# Patient Record
Sex: MALE | Race: OTHER | HISPANIC OR LATINO | URBAN - METROPOLITAN AREA
[De-identification: names, ages, dates, MRNs, and addresses within clinical notes are randomized per-mention and may not be internally consistent; named-entity substitution may affect disease eponyms.]

---

## 2023-02-28 ENCOUNTER — EMERGENCY (EMERGENCY)
Facility: HOSPITAL | Age: 58
LOS: 1 days | Discharge: HOME | End: 2023-02-28
Attending: EMERGENCY MEDICINE | Admitting: EMERGENCY MEDICINE
Payer: COMMERCIAL

## 2023-02-28 VITALS
HEART RATE: 64 BPM | DIASTOLIC BLOOD PRESSURE: 75 MMHG | SYSTOLIC BLOOD PRESSURE: 129 MMHG | RESPIRATION RATE: 18 BRPM | TEMPERATURE: 98 F | OXYGEN SATURATION: 95 %

## 2023-02-28 VITALS
HEART RATE: 67 BPM | TEMPERATURE: 98 F | SYSTOLIC BLOOD PRESSURE: 164 MMHG | HEIGHT: 68 IN | OXYGEN SATURATION: 99 % | RESPIRATION RATE: 20 BRPM | DIASTOLIC BLOOD PRESSURE: 82 MMHG | WEIGHT: 209.44 LBS

## 2023-02-28 LAB
ALBUMIN SERPL ELPH-MCNC: 4 G/DL — SIGNIFICANT CHANGE UP (ref 3.3–5)
ALP SERPL-CCNC: 96 U/L — SIGNIFICANT CHANGE UP (ref 30–120)
ALT FLD-CCNC: 41 U/L DA — SIGNIFICANT CHANGE UP (ref 10–60)
ANION GAP SERPL CALC-SCNC: 7 MMOL/L — SIGNIFICANT CHANGE UP (ref 5–17)
AST SERPL-CCNC: 27 U/L — SIGNIFICANT CHANGE UP (ref 10–40)
BASOPHILS # BLD AUTO: 0.07 K/UL — SIGNIFICANT CHANGE UP (ref 0–0.2)
BASOPHILS NFR BLD AUTO: 0.5 % — SIGNIFICANT CHANGE UP (ref 0–2)
BILIRUB SERPL-MCNC: 0.4 MG/DL — SIGNIFICANT CHANGE UP (ref 0.2–1.2)
BUN SERPL-MCNC: 14 MG/DL — SIGNIFICANT CHANGE UP (ref 7–23)
CALCIUM SERPL-MCNC: 9.1 MG/DL — SIGNIFICANT CHANGE UP (ref 8.4–10.5)
CHLORIDE SERPL-SCNC: 102 MMOL/L — SIGNIFICANT CHANGE UP (ref 96–108)
CO2 SERPL-SCNC: 28 MMOL/L — SIGNIFICANT CHANGE UP (ref 22–31)
CREAT SERPL-MCNC: 0.98 MG/DL — SIGNIFICANT CHANGE UP (ref 0.5–1.3)
EGFR: 91 ML/MIN/1.73M2 — SIGNIFICANT CHANGE UP
EOSINOPHIL # BLD AUTO: 0.43 K/UL — SIGNIFICANT CHANGE UP (ref 0–0.5)
EOSINOPHIL NFR BLD AUTO: 3.3 % — SIGNIFICANT CHANGE UP (ref 0–6)
GLUCOSE SERPL-MCNC: 104 MG/DL — HIGH (ref 70–99)
HCT VFR BLD CALC: 38 % — LOW (ref 39–50)
HGB BLD-MCNC: 13.3 G/DL — SIGNIFICANT CHANGE UP (ref 13–17)
IMM GRANULOCYTES NFR BLD AUTO: 0.5 % — SIGNIFICANT CHANGE UP (ref 0–0.9)
LYMPHOCYTES # BLD AUTO: 1.99 K/UL — SIGNIFICANT CHANGE UP (ref 1–3.3)
LYMPHOCYTES # BLD AUTO: 15.2 % — SIGNIFICANT CHANGE UP (ref 13–44)
MCHC RBC-ENTMCNC: 30.4 PG — SIGNIFICANT CHANGE UP (ref 27–34)
MCHC RBC-ENTMCNC: 35 GM/DL — SIGNIFICANT CHANGE UP (ref 32–36)
MCV RBC AUTO: 86.8 FL — SIGNIFICANT CHANGE UP (ref 80–100)
MONOCYTES # BLD AUTO: 0.92 K/UL — HIGH (ref 0–0.9)
MONOCYTES NFR BLD AUTO: 7 % — SIGNIFICANT CHANGE UP (ref 2–14)
NEUTROPHILS # BLD AUTO: 9.61 K/UL — HIGH (ref 1.8–7.4)
NEUTROPHILS NFR BLD AUTO: 73.5 % — SIGNIFICANT CHANGE UP (ref 43–77)
NRBC # BLD: 0 /100 WBCS — SIGNIFICANT CHANGE UP (ref 0–0)
PLATELET # BLD AUTO: 235 K/UL — SIGNIFICANT CHANGE UP (ref 150–400)
POTASSIUM SERPL-MCNC: 3.8 MMOL/L — SIGNIFICANT CHANGE UP (ref 3.5–5.3)
POTASSIUM SERPL-SCNC: 3.8 MMOL/L — SIGNIFICANT CHANGE UP (ref 3.5–5.3)
PROT SERPL-MCNC: 7.6 G/DL — SIGNIFICANT CHANGE UP (ref 6–8.3)
RBC # BLD: 4.38 M/UL — SIGNIFICANT CHANGE UP (ref 4.2–5.8)
RBC # FLD: 13 % — SIGNIFICANT CHANGE UP (ref 10.3–14.5)
SODIUM SERPL-SCNC: 137 MMOL/L — SIGNIFICANT CHANGE UP (ref 135–145)
TROPONIN I, HIGH SENSITIVITY RESULT: 7 NG/L — SIGNIFICANT CHANGE UP
WBC # BLD: 13.09 K/UL — HIGH (ref 3.8–10.5)
WBC # FLD AUTO: 13.09 K/UL — HIGH (ref 3.8–10.5)

## 2023-02-28 PROCEDURE — 70450 CT HEAD/BRAIN W/O DYE: CPT | Mod: MA

## 2023-02-28 PROCEDURE — 71045 X-RAY EXAM CHEST 1 VIEW: CPT | Mod: 26

## 2023-02-28 PROCEDURE — 96374 THER/PROPH/DIAG INJ IV PUSH: CPT

## 2023-02-28 PROCEDURE — 36415 COLL VENOUS BLD VENIPUNCTURE: CPT

## 2023-02-28 PROCEDURE — 99284 EMERGENCY DEPT VISIT MOD MDM: CPT | Mod: 25

## 2023-02-28 PROCEDURE — 71045 X-RAY EXAM CHEST 1 VIEW: CPT

## 2023-02-28 PROCEDURE — 72125 CT NECK SPINE W/O DYE: CPT | Mod: MA

## 2023-02-28 PROCEDURE — 72125 CT NECK SPINE W/O DYE: CPT | Mod: 26,MA

## 2023-02-28 PROCEDURE — 85025 COMPLETE CBC W/AUTO DIFF WBC: CPT

## 2023-02-28 PROCEDURE — 70450 CT HEAD/BRAIN W/O DYE: CPT | Mod: 26,MA

## 2023-02-28 PROCEDURE — 84484 ASSAY OF TROPONIN QUANT: CPT

## 2023-02-28 PROCEDURE — 80053 COMPREHEN METABOLIC PANEL: CPT

## 2023-02-28 PROCEDURE — 73030 X-RAY EXAM OF SHOULDER: CPT | Mod: 26,RT

## 2023-02-28 PROCEDURE — 73030 X-RAY EXAM OF SHOULDER: CPT

## 2023-02-28 PROCEDURE — 96375 TX/PRO/DX INJ NEW DRUG ADDON: CPT

## 2023-02-28 PROCEDURE — 99285 EMERGENCY DEPT VISIT HI MDM: CPT

## 2023-02-28 RX ORDER — ONDANSETRON 8 MG/1
4 TABLET, FILM COATED ORAL ONCE
Refills: 0 | Status: COMPLETED | OUTPATIENT
Start: 2023-02-28 | End: 2023-02-28

## 2023-02-28 RX ORDER — IBUPROFEN 200 MG
1 TABLET ORAL
Qty: 12 | Refills: 0
Start: 2023-02-28 | End: 2023-03-02

## 2023-02-28 RX ORDER — ACETAMINOPHEN 500 MG
1000 TABLET ORAL ONCE
Refills: 0 | Status: COMPLETED | OUTPATIENT
Start: 2023-02-28 | End: 2023-02-28

## 2023-02-28 RX ORDER — IBUPROFEN 200 MG
600 TABLET ORAL ONCE
Refills: 0 | Status: COMPLETED | OUTPATIENT
Start: 2023-02-28 | End: 2023-02-28

## 2023-02-28 RX ORDER — CYCLOBENZAPRINE HYDROCHLORIDE 10 MG/1
1 TABLET, FILM COATED ORAL
Qty: 9 | Refills: 0
Start: 2023-02-28 | End: 2023-03-02

## 2023-02-28 RX ADMIN — Medication 1000 MILLIGRAM(S): at 21:45

## 2023-02-28 RX ADMIN — Medication 400 MILLIGRAM(S): at 20:02

## 2023-02-28 RX ADMIN — ONDANSETRON 4 MILLIGRAM(S): 8 TABLET, FILM COATED ORAL at 20:02

## 2023-02-28 RX ADMIN — Medication 600 MILLIGRAM(S): at 22:05
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
## 2023-02-28 NOTE — ED PROVIDER NOTE - PATIENT PORTAL LINK FT
You can access the FollowMyHealth Patient Portal offered by [facility] by registering at the following website: [email or web address]. By joining [facility]’s FollowMyHealth portal, you will also be able to view your health information using other applications (apps) compatible with our system.
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
## 2023-02-28 NOTE — ED PROVIDER NOTE - MUSCULOSKELETAL MINIMAL EXAM
mild tenderness to anterior R shoulder, no deformity. No c-spine tenderness/atraumatic/normal range of motion

## 2023-02-28 NOTE — ED PROVIDER NOTE - NSFOLLOWUPCLINICS_GEN_ALL_ED_FT
[place]  Internal Medicine  [address] [place] [id number]  Phone: [phone]  Fax:   Follow Up Time: 4-6 Days

## 2023-02-28 NOTE — ED PROVIDER NOTE - NSFOLLOWUPINSTRUCTIONS_ED_ALL_ED_FT
1) Follow up with your doctor in 1-2 days  2) Return to the ER for worsening or concerning symptoms        [facility]                                                                                                                                                                                                                                                                                                                                                                                                                                                                                                                                                                                                                                                                        Lesión en la brook en los adultos    Head Injury, Adult       Hay muchos tipos de lesiones en la brook. Las lesiones en la brook pueden ser tan leves denise un chichón pequeño o pueden ser un problema médico grave. Algunas de las lesiones graves en la brook son:  •Lesión que provoque un impacto en el cerebro (conmoción cerebral).      •Un moretón (contusión) en el cerebro. [place] significa que hay hemorragia en el cerebro que puede causar hinchazón.      •Fisura en el cráneo (fractura de cráneo).      •Hemorragia en el cerebro que se acumula, se coagula y forma elysia protuberancia (hematoma).      Después de elysia lesión en la brook, la mayoría de los problemas ocurren dentro de las primeras 24 horas, mireille los efectos secundarios pueden aparecer entre 7 y 10 días después de la lesión. Es importante controlar stoner afección para goldy si hay cambios. Es posible que deban observarlo en el departamento de emergencias o en el servicio de atención urgente, o también puede ser que deban hospitalizarlo.      ¿Cuáles son las causas?    Hay muchas causas posibles de elysia lesión en la brook. Las lesiones graves en la brook puede deberse a un accidente automovilístico, a accidentes en bicicleta o motocicleta, a lesiones deportivas, a caídas y a ser golpeado por un objeto.      ¿Cuáles son los síntomas?    Los síntomas de lesión en la brook incluyen elysia contusión, un chichón o sangrado en el lugar de la lesión. Otros síntomas físicos pueden ser:  •Dolor de brook.      •Náuseas o vómitos.      •Mareos.      •Visión borrosa o doble.      •Sentir incomodidad cerca de luces brillantes o ruidos madeline.      •Convulsiones.      •Cansancio.      •Dificultad para despertarse.      •Pérdida de la conciencia.      Los síntomas mentales o emocionales pueden incluir los siguientes:  •Irritabilidad.      •Confusión y problemas de memoria.      •Falta de atención y concentración.      •Cambios en los hábitos de alimentación o en el sueño.      •Sentir ansiedad o depresión.        ¿Cómo se diagnostica?    Esta afección suele diagnosticarse en función de los síntomas, de elysia descripción de la lesión y de un examen físico. También pueden hacerle estudios de diagnóstico por imágenes, denise elysia resonancia magnética (RM) o elysia exploración por tomografía computarizada (TC).      ¿Cómo se trata?    El tratamiento de esta afección depende de la gravedad y el tipo de lesión que sufrió. El objetivo principal del tratamiento es prevenir complicaciones y darle tiempo al cerebro para que se recupere.    Lesión de brook leve     Si usted sufre elysia lesión de brook leve, es posible que lo envíen a casa, y el tratamiento puede incluir lo siguiente:  •Observación. Un adulto responsable debe quedarse con usted chandler 24 horas después de producida la lesión y controlarlo con frecuencia.      •[place] físico.      •[place] cerebral.      •Analgésicos.      Lesión de brook grave    Si tiene elysia lesión de brook grave, el tratamiento puede incluir lo siguiente:•Observación minuciosa. [place] incluye hospitalización con la siguiente atención:  •Exámenes físicos frecuentes.      •Controles frecuentes del funcionamiento del cerebro y el sistema nervioso (estado neurológico).      •Control de la presión arterial y los niveles de oxígeno.        •Medicamentos para aliviar el dolor, prevenir las convulsiones y disminuir la hinchazón del cerebro.      •Protección de las vías respiratorias y asistencia respiratoria. [place] puede incluir un respirador.      •Tratamientos para controlar y tratar la hinchazón dentro del cerebro.    •Cirugía de cerebro. Esta puede ser necesaria en los siguientes casos:  •Extraer elysia acumulación de jere o coágulos de jere.      •Interrumpir el sangrado.      •Retirar elysia parte del cráneo para dejar espacio a fin de que el cerebro se hinche.          Siga estas instrucciones en stoner casa:    Actividad     •Descanse y evite actividades que cristobal físicamente difíciles o agotadoras.      •Asegúrese de dormir lo suficiente.    •Deje que el cerebro descanse limitando las actividades que requieran pensar mucho o prestar atención, denise las siguientes:  •Mirar televisión.      •Jugar juegos de memoria y armar rompecabezas.      •Tareas para el hogar o trabajos relacionados con el empleo.      •Trabajar en la computadora, usar las redes sociales y enviar mensajes de texto.        •Evite actividades que puedan causar otra lesión en la brook, denise practicar deportes, hasta que el médico lo autorice. Puede ser peligroso tener otra lesión en la brook antes de que se haya recuperado de la primera.      •Pregúntele al médico cuándo puede retomar nolvia actividades habituales, incluidos el trabajo o el estudio. Pídale al médico un plan escalonado para retomar nolvia actividades de forma gradual.      •Consulte a stoner médico sobre cuándo puede conducir, andar en bicicleta o usar maquinaria pesada. La capacidad para reaccionar puede ser más lenta luego de elysia lesión cerebral. No realice estas actividades si se siente mareado.        Estilo de eloy      • No vickie alcohol hasta que el médico lo autorice. No consuma drogas. El alcohol y ciertas drogas pueden demorar stoner recuperación y ponerlo en riesgo de nuevas lesiones.      •Si le resulta más difícil que lo habitual recordar las cosas, escríbalas.      •Trate de hacer elysia cosa por vez si se distrae con facilidad.      •Consulte con familiares y amigos si debe kathryn decisiones importantes.      •Cuénteles sobre stoner lesión, los síntomas y las restricciones a nolvia amigos, a stoner graeme, a un colega de confianza y a stoner gerente en el trabajo. Pídales que observen si aparecen nuevos problemas o empeoran los existentes.      Instrucciones generales     •Carrier los medicamentos de venta antoine y los recetados solamente denise se lo haya indicado el médico.      •Pídale a alguien que lo acompañe chandler 24 horas después de la lesión en la brook. Esta persona debe observar cambios en los síntomas y estar preparada para obtener ayuda médica.      •Concurra a todas las visitas de seguimiento denise se lo haya indicado el médico. [place] es importante.        ¿Cómo se stephania?    •Trabaje para mejorar el equilibrio y la fuerza a fin de evitar caídas.      •Use el cinturón de seguridad cuando se encuentre en un vehículo en movimiento.      •Use un enedelia al andar en bicicleta, esquiar o practicar cualquier otro deporte o actividad que tenga riesgo de lesiones.    •Si puneet alcohol:•Limite la cantidad que puneet:  •De 0 a 1 medida por día para las mujeres que no estén embarazadas.      •De 0 a 2 medidas por día para los hombres.        •Esté atento a la cantidad de alcohol que hay en las bebidas que carol. En los [place], elysia medida equivale a elysia botella de cerveza de 12 oz (355 ml), un vaso de vino de 5 oz (148 ml) o un vaso de elysia bebida alcohólica de lucia graduación de 1½ oz (44 ml).      •Carrier medidas de seguridad en stoner hogar, por ejemplo:  •Mantenga los pisos y las escaleras en orden.      •Coloque barras para sostén en los germaine y pasamanos en las escaleras.      •Ponga alfombras antideslizantes en pisos y bañeras.      •Mejore la iluminación en las zonas de penumbra.          Dónde buscar más información    •Centers for Disease Control and Prevention (Centros para el Control y la Prevención de Enfermedades): www.cdc.gov        Solicite ayuda de inmediato si:  •Tiene lo siguiente:  •Dolor de brook intenso que no desaparece con los medicamentos.      •Dificultad para caminar o debilidad en los brazos o las piernas.      •Secreción transparente o con jere que proviene de la nariz o de los oídos.      •Cambios en la visión.      •Elysia convulsión.      •Mayor confusión o irritabilidad.        •Nolvia síntomas empeoran.      •Está más somnoliento de lo normal o tiene dificultad para mantenerse despierto.      •Pierde el equilibrio.      •Las pupilas cambian de tamaño.      •Arrastra las palabras.      •Stoner mareo empeora.      •Vomita.      Estos síntomas pueden representar un problema grave que constituye elysia emergencia. No espere a goldy si los síntomas desaparecen. Solicite atención médica de inmediato. Comuníquese con el servicio de emergencias de stoner localidad (911 en los [place]). No conduzca por nolvia propios medios hasta el hospital.       Resumen    •Las lesiones en la brook pueden ser leves o pueden ser un problema médico grave que requiere atención inmediata.      •El tratamiento de esta afección depende de la gravedad y el tipo de lesión que sufrió.      •Pídale a alguien que lo acompañe chandler 24 horas después de la lesión y que orn cómo está usted con frecuencia.      •Pregúntele al médico cuándo puede retomar nolvia actividades habituales, incluidos el trabajo o el estudio.      •La prevención de lesiones en la brook incluye el uso de cinturón de seguridad en un vehículo motorizado, el uso de enedelia en bicicleta, limitar el consumo de alcohol y kathryn medidas de seguridad en el hogar.      Esta información no tiene denise fin reemplazar el consejo del médico. Asegúrese de hacerle al médico cualquier pregunta que tenga.      Document Revised: 01/22/2021 Document Reviewed: 01/22/2021    Elsevier Patient Education © 2023 [employer]
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
## 2023-02-28 NOTE — ED ADULT NURSE NOTE - NSIMPLEMENTINTERV_GEN_ALL_ED
Implemented All Fall Risk Interventions:  [place] to call system. Call bell, personal items and telephone within reach. Instruct patient to call for assistance. Room bathroom lighting operational. Non-slip footwear when patient is off stretcher. Physically safe environment: no spills, clutter or unnecessary equipment. Stretcher in lowest position, wheels locked, appropriate side rails in place. Provide visual cue, wrist band, yellow gown, etc. Monitor gait and stability. Monitor for mental status changes and reorient to person, place, and time. Review medications for side effects contributing to fall risk. Reinforce activity limits and safety measures with patient and family.